# Patient Record
Sex: MALE | Race: WHITE | HISPANIC OR LATINO | URBAN - METROPOLITAN AREA
[De-identification: names, ages, dates, MRNs, and addresses within clinical notes are randomized per-mention and may not be internally consistent; named-entity substitution may affect disease eponyms.]

---

## 2022-05-23 ENCOUNTER — EMERGENCY (EMERGENCY)
Facility: HOSPITAL | Age: 22
LOS: 1 days | Discharge: HOME | End: 2022-05-23
Attending: EMERGENCY MEDICINE | Admitting: EMERGENCY MEDICINE
Payer: COMMERCIAL

## 2022-05-23 VITALS
TEMPERATURE: 99 F | OXYGEN SATURATION: 96 % | HEIGHT: 67 IN | SYSTOLIC BLOOD PRESSURE: 107 MMHG | HEART RATE: 78 BPM | DIASTOLIC BLOOD PRESSURE: 64 MMHG | RESPIRATION RATE: 16 BRPM | WEIGHT: 145.06 LBS

## 2022-05-23 PROCEDURE — 73610 X-RAY EXAM OF ANKLE: CPT

## 2022-05-23 PROCEDURE — 99283 EMERGENCY DEPT VISIT LOW MDM: CPT | Mod: 25

## 2022-05-23 PROCEDURE — 99283 EMERGENCY DEPT VISIT LOW MDM: CPT

## 2022-05-23 PROCEDURE — 73610 X-RAY EXAM OF ANKLE: CPT | Mod: 26,RT

## 2022-05-23 PROCEDURE — 99053 MED SERV 10PM-8AM 24 HR FAC: CPT

## 2022-05-23 RX ORDER — IBUPROFEN 200 MG
600 TABLET ORAL ONCE
Refills: 0 | Status: COMPLETED | OUTPATIENT
Start: 2022-05-23 | End: 2022-05-23

## 2022-05-23 RX ADMIN — Medication 600 MILLIGRAM(S): at 23:30

## 2022-05-23 RX ADMIN — Medication 600 MILLIGRAM(S): at 23:03

## 2022-05-23 NOTE — ED PROVIDER NOTE - OBJECTIVE STATEMENT
pt c/o R ankle pain, moderate, sharp, after twisting ankle playing soccer tonight. no knee pain. no other injury

## 2022-05-23 NOTE — ED ADULT NURSE NOTE - NSIMPLEMENTINTERV_GEN_ALL_ED
Implemented All Fall Risk Interventions:  [place] to call system. Call bell, personal items and telephone within reach. Instruct patient to call for assistance. Room bathroom lighting operational. Non-slip footwear when patient is off stretcher. Physically safe environment: no spills, clutter or unnecessary equipment. Stretcher in lowest position, wheels locked, appropriate side rails in place. Provide visual cue, wrist band, yellow gown, etc. Monitor gait and stability. Monitor for mental status changes and reorient to person, place, and time. Review medications for side effects contributing to fall risk. Reinforce activity limits and safety measures with patient and family.

## 2022-05-23 NOTE — ED PROVIDER NOTE - PATIENT PORTAL LINK FT
You can access the FollowMyHealth Patient Portal offered by [facility] by registering at the following website: [email or web address]. By joining [employer]’s FollowMyHealth portal, you will also be able to view your health information using other applications (apps) compatible with our system.

## 2022-05-23 NOTE — ED ADULT NURSE NOTE - OBJECTIVE STATEMENT
pt presents to the ED with complaint of Right ankle injury. pt states playing soccer and fell rolling Right ankle. pt denies head strike. pt states pain worsens with activity.  VSS, no acute distress noted at this time.

## 2022-05-23 NOTE — ED ADULT NURSE REASSESSMENT NOTE - NS ED NURSE REASSESS COMMENT FT1
Pt. given air cast to injured ankle per MD order. Pt. given crutches to ambulate. Safety maintained. Teaching for crutch use and air cast use prior to d/c.

## 2022-05-23 NOTE — ED PROVIDER NOTE - NSFOLLOWUPINSTRUCTIONS_ED_ALL_ED_FT
- take motrin 400-600mg every 6 hrs for pain as needed  - wear aircast ankle splint  - see orthopedist this week    Ankle Sprain    WHAT YOU NEED TO KNOW:    An ankle sprain happens when 1 or more ligaments in your ankle joint stretch or tear. Ligaments are tough tissues that connect bones. Ligaments support your joints and keep your bones in place.    DISCHARGE INSTRUCTIONS:    Return to the emergency department if:     You have severe pain in your ankle.  Your foot or toes are cold or numb.  Your ankle becomes more weak or unstable (wobbly).  You are unable to put any weight on your ankle or foot.  Your swelling has increased or returned.    Call your doctor if:     Your pain does not go away, even after treatment.  You have questions or concerns about your condition or care.    Medicines: You may need any of the following:     NSAIDs, such as ibuprofen, help decrease swelling, pain, and fever. This medicine is available with or without a doctor's order. NSAIDs can cause stomach bleeding or kidney problems in certain people. If you take blood thinner medicine, always ask your healthcare provider if NSAIDs are safe for you. Always read the medicine label and follow directions.    Acetaminophen decreases pain and fever. It is available without a doctor's order. Ask how much to take and how often to take it. Follow directions. Read the labels of all other medicines you are using to see if they also contain acetaminophen, or ask your doctor or pharmacist. Acetaminophen can cause liver damage if not taken correctly. Do not use more than 4 grams (4,000 milligrams) total of acetaminophen in one day.     Prescription pain medicine may be given. Ask your healthcare provider how to take this medicine safely. Some prescription pain medicines contain acetaminophen. Do not take other medicines that contain acetaminophen without talking to your healthcare provider. Too much acetaminophen may cause liver damage. Prescription pain medicine may cause constipation. Ask your healthcare provider how to prevent or treat constipation.     Take your medicine as directed. Contact your healthcare provider if you think your medicine is not helping or if you have side effects. Tell him or her if you are allergic to any medicine. Keep a list of the medicines, vitamins, and herbs you take. Include the amounts, and when and why you take them. Bring the list or the pill bottles to follow-up visits. Carry your medicine list with you in case of an emergency.    Self-care:     Use support devices, such as a brace, cast, or splint, to limit your movement and protect your joint. You may need to use crutches to decrease your pain as you move around.    Go to physical therapy as directed. A physical therapist teaches you exercises to help improve movement and strength, and to decrease pain.    Rest your ankle so that it can heal. Return to normal activities as directed.    Apply ice on your ankle for 15 to 20 minutes every hour or as directed. Use an ice pack, or put crushed ice in a plastic bag. Cover it with a towel. Ice helps prevent tissue damage and decreases swelling and pain.    Compress your ankle. Ask if you should wrap an elastic bandage around your injured ligament. An elastic bandage provides support and helps decrease swelling and movement so your joint can heal. Wear as long as directed.    Elevate your ankle above the level of your heart as often as you can. This will help decrease swelling and pain. Prop your ankle on pillows or blankets to keep it elevated comfortably. Elevate Limb       Prevent another ankle sprain:     Let your ankle heal. Find out how long your ligament needs to heal. Do not do any physical activity until your healthcare provider says it is okay. If you start activity too soon, you may develop a more serious injury.    Always warm up and stretch before you exercise or play sports.    Use the right equipment. Always wear shoes that fit well and are made for the activity that you are doing. You may also need ankle supports, elbow and knee pads, or braces.    Follow up with your doctor as directed: Write down your questions so you remember to ask them during your visits.

## 2022-05-23 NOTE — ED PROVIDER NOTE - MUSCULOSKELETAL [+], MLM
Chief Complaint   Patient presents with     Consult     [facility] NEW - RIGHT SIDED PAIN       Khadar Pringle, EMT   JOINT PAIN

## 2022-05-23 NOTE — ED PROVIDER NOTE - CARE PROVIDER_API CALL
Atif Dee)  Orthopaedic Surgery  [address], [address]  [place] [address]  Phone: [phone]  Fax: [phone]  Follow Up Time: 1-3 Days

## 2022-05-23 NOTE — ED PROVIDER NOTE - CLINICAL SUMMARY MEDICAL DECISION MAKING FREE TEXT BOX
R ankle pain p injury. xray r/o fx vs sprain. motrin for pain    xr neg for fx. air cast placed. f/u ortho

## 2022-09-16 NOTE — ED ADULT TRIAGE NOTE - WILL THE PATIENT ACCEPT THE PFIZER COVID-19 VACCINE IF ELIGIBLE AND IT IS AVAILABLE?
Patient Specific Counseling (Will Not Stick From Patient To Patient): Patient will begin Efudex BID for 2 weeks. Detail Level: Detailed Not applicable

## 2022-12-29 ENCOUNTER — EMERGENCY (EMERGENCY)
Facility: HOSPITAL | Age: 22
LOS: 1 days | Discharge: HOME | End: 2022-12-29
Attending: EMERGENCY MEDICINE | Admitting: EMERGENCY MEDICINE
Payer: COMMERCIAL

## 2022-12-29 VITALS
HEIGHT: 67 IN | TEMPERATURE: 98 F | DIASTOLIC BLOOD PRESSURE: 91 MMHG | HEART RATE: 78 BPM | SYSTOLIC BLOOD PRESSURE: 131 MMHG | OXYGEN SATURATION: 98 % | WEIGHT: 156.09 LBS | RESPIRATION RATE: 17 BRPM

## 2022-12-29 VITALS
DIASTOLIC BLOOD PRESSURE: 82 MMHG | TEMPERATURE: 98 F | HEART RATE: 72 BPM | RESPIRATION RATE: 18 BRPM | OXYGEN SATURATION: 98 % | SYSTOLIC BLOOD PRESSURE: 124 MMHG

## 2022-12-29 PROCEDURE — 99283 EMERGENCY DEPT VISIT LOW MDM: CPT

## 2022-12-29 RX ORDER — NEOMYCIN/POLYMYXIN B/HYDROCORT
1 SUSPENSION, DROPS(FINAL DOSAGE FORM)(ML) OTIC (EAR) ONCE
Refills: 0 | Status: COMPLETED | OUTPATIENT
Start: 2022-12-29 | End: 2022-12-29

## 2022-12-29 RX ADMIN — Medication 1 DROP(S): at 20:55

## 2022-12-29 NOTE — ED PROVIDER NOTE - PATIENT PORTAL LINK FT
You can access the FollowMyHealth Patient Portal offered by [facility] by registering at the following website: [email or web address]. By joining [employer]’s FollowMyHealth portal, you will also be able to view your health information using other applications (apps) compatible with our system.

## 2022-12-29 NOTE — ED ADULT NURSE NOTE - NSIMPLEMENTINTERV_GEN_ALL_ED
Implemented All Universal Safety Interventions:  [place] to call system. Call bell, personal items and telephone within reach. Instruct patient to call for assistance. Room bathroom lighting operational. Non-slip footwear when patient is off stretcher. Physically safe environment: no spills, clutter or unnecessary equipment. Stretcher in lowest position, wheels locked, appropriate side rails in place.

## 2022-12-29 NOTE — ED PROVIDER NOTE - OBJECTIVE STATEMENT
A 22-year-old male with no significant past medical presents to the ED complaining of right ear discomfort for 5 months mild dull constant discomfort no discharge from the ear no hearing problem now complains of mild headache

## 2022-12-29 NOTE — ED PROVIDER NOTE - NSFOLLOWUPINSTRUCTIONS_ED_ALL_ED_FT
Earache, Adult      An earache, or ear pain, can be caused by many things, including:  •An infection.      •Ear wax buildup.      •Ear pressure.      •Something in the ear that should not be there (foreign body).      •A sore throat.      •Tooth problems.      •Jaw problems.      Treatment of the earache will depend on the cause. If the cause is not clear or cannot be determined, you may need to watch your symptoms until your earache goes away or until a cause is found.      Follow these instructions at home:    Medicines     •Take or apply over-the-counter and prescription medicines only as told by your health care provider.      •If you were prescribed an antibiotic medicine, use it as told by your health care provider. Do not stop using the antibiotic even if you start to feel better.      • Do not put anything in your ear other than medicine that is prescribed by your health care provider.      Managing pain     If directed, apply heat to the affected area as often as told by your health care provider. Use the heat source that your health care provider recommends, such as a moist heat pack or a heating pad.  •Place a towel between your skin and the heat source.      •Leave the heat on for 20–30 minutes.      •Remove the heat if your skin turns bright red. This is especially important if you are unable to feel pain, heat, or cold. You may have a greater risk of getting burned.        If directed, put ice on the affected area as often as told by your health care provider. To do this:              •Put ice in a plastic bag.      •Place a towel between your skin and the bag.      •Leave the ice on for 20 minutes, 2–3 times a day.      General instructions    •Pay attention to any changes in your symptoms.      •Try resting in an upright position instead of lying down. This may help to reduce pressure in your ear and relieve pain.      •Chew gum if it helps to relieve your ear pain.      •Treat any allergies as told by your health care provider.      •Drink enough fluid to keep your urine pale yellow.      •It is up to you to get the results of any tests that were done. Ask your health care provider, or the department that is doing the tests, when your results will be ready.      •Keep all follow-up visits as told by your health care provider. This is important.        Contact a health care provider if:    •Your pain does not improve within 2 days.      •Your earache gets worse.      •You have new symptoms.      •You have a fever.        Get help right away if you:    •Have a severe headache.      •Have a stiff neck.      •Have trouble swallowing.      •Have redness or swelling behind your ear.      •Have fluid or blood coming from your ear.      •Have hearing loss.      •Feel dizzy.        Summary    •An earache, or ear pain, can be caused by many things.      •Treatment of the earache will depend on the cause. Follow recommendations from your health care provider to treat your ear pain.      •If the cause is not clear or cannot be determined, you may need to watch your symptoms until your earache goes away or until a cause is found.      •Keep all follow-up visits as told by your health care provider. This is important.      This information is not intended to replace advice given to you by your health care provider. Make sure you discuss any questions you have with your health care provider.      Document Revised: 07/25/2020 Document Reviewed: 07/25/2020    [employer] Patient Education © 2022 [employer]

## 2022-12-29 NOTE — ED PROVIDER NOTE - CLINICAL SUMMARY MEDICAL DECISION MAKING FREE TEXT BOX
pt p/w right ear pain for weeks, no fever, on exam has minimal ear erythema and mucoid discharge in right ear, likely has mod otitis externa , started on polytrim drops

## 2022-12-30 PROBLEM — Z78.9 OTHER SPECIFIED HEALTH STATUS: Chronic | Status: ACTIVE | Noted: 2022-05-24

## 2023-10-26 NOTE — ED ADULT NURSE NOTE - HOW OFTEN DO YOU HAVE A DRINK CONTAINING ALCOHOL?
Notified patient of Dr Mcnair's and Dr Stack' responses. He voiced understanding and appreciation. FELICE Verduzco Martin G, MD  You13 hours ago (6:20 PM)     MR  I would remind him that Dr Mcnair is a great doctor as well.  And from my perspective, since he just had a normal cath, I don't see any reason he needs to be seen by any of us, certainly not an interventionalist     Jermeias Mcnair MD  You3 days ago       Sure go ahead     You  Richi Stack MD; Jeremias Mcnair MD3 days ago     TH  Are you both okay with this? Please advise. Esperanza Salazar MA      Monthly or less

## 2025-02-20 NOTE — ED PROVIDER NOTE - CARE PROVIDERS DIRECT ADDRESSES
Rounded on patient.  Patient awake in bed watching TV with knee straight.  Vitals are stable.  Patient is now saline locked. Patient ambulated to bathroom to void with assist and FWW and returned to bed. Patient tolerated well. Left knee dressing is clean, dry, and intact.  Hemovac secure with small amount os serosanguinous output draining to gravity. Ice man cooler in place and refilled.  SCDs on.  Pedal pulses 3+, movement/sensation intact. CPAP from home set up at bedside.  Patient denies any other needs at this time.  Call light and possessions within reach.  Bed alarm on.    [email or web address]